# Patient Record
Sex: FEMALE | Race: WHITE
[De-identification: names, ages, dates, MRNs, and addresses within clinical notes are randomized per-mention and may not be internally consistent; named-entity substitution may affect disease eponyms.]

---

## 2017-12-23 NOTE — EDM.PDOC
ED HPI GENERAL MEDICAL PROBLEM





- General


Chief Complaint: Laceration


Stated Complaint: laceration


Time Seen by Provider: 12/23/17 16:35


Source of Information: Reports: Patient


History Limitations: Reports: No Limitations





- History of Present Illness


INITIAL COMMENTS - FREE TEXT/NARRATIVE: 





Was cutting up potatoes and the knife slipped and she cut the proximal left 

fifth digit.  It is 1 cm in length and gapping.  minimal bleeding noted.  No 

other complaints or injuries.


Onset: Today


Location: Reports: Other (left fifth digit)





- Related Data


 Allergies











Allergy/AdvReac Type Severity Reaction Status Date / Time


 


ciprofloxacin [From Cipro] AdvReac  Itching Verified 12/23/17 16:30














Social & Family History





- Tobacco Use


Smoking Status *Q: Never Smoker





ED ROS GENERAL





- Review of Systems


Review Of Systems: See Below


Skin: Reports: Wound (left fifth digit.)





ED EXAM, SKIN/RASH


Exam: See Below


Exam Limited By: No Limitations


General Appearance: Alert, WD/WN, No Apparent Distress


Skin: Wound/Incision (1 cm laceration to the proximal left fifth digit.)





ED SKIN PROCEDURES





- Laceration/Wound Repair


  ** Left Proximal Finger


Lac/Wound length In cm: 1


Appearance: Linear, Clean


Distal NVT: Neuro & Vascular Intact


Anesthetic Type: Local


Local Anesthesia - Lidocaine (Xylocaine): 1% Plain


Local Anesthetic Volume: 1cc


Skin Prep: Saline


Suture Size: other (5-0)


# of Sutures: 2


Suture Type: Nylon, Interrupted, Simple


Tetanus Status Addressed: Yes


Complications: No





Course





- Orders/Labs/Meds


Orders: 





 Active Orders 24 hr











 Category Date Time Status


 


 Lidocaine 1% [Xylocaine 1%] Med  12/23/17 16:33 Once





 20 ml INJECT ONETIME ONE   














Departure





- Departure


Time of Disposition: 16:52


Disposition: Home, Self-Care 01


Condition: Good


Clinical Impression: 


 Laceration








- Discharge Information


Additional Instructions: 


keep clean and dry.  May shower and dry but do not soak in water.


sutures out in 10 days


recheck with any signs of infection.





- Problem List & Annotations


(1) Laceration


SNOMED Code(s): 848596653


   Code(s): IVP7144 -    Status: Acute   Priority: High   Current Visit: Yes   





- Problem List Review


Problem List Initiated/Reviewed/Updated: Yes





- My Orders


Last 24 Hours: 





My Active Orders





12/23/17 16:33


Lidocaine 1% [Xylocaine 1%]   20 ml INJECT ONETIME ONE 














- Assessment/Plan


Last 24 Hours: 





My Active Orders





12/23/17 16:33


Lidocaine 1% [Xylocaine 1%]   20 ml INJECT ONETIME ONE 











Plan: 





Will come back to the clinic on Tuesday to determine if she needs tetanus 

updated.

## 2022-03-27 ENCOUNTER — HOSPITAL ENCOUNTER (EMERGENCY)
Dept: HOSPITAL 38 - CC.ED | Age: 47
Discharge: HOME | End: 2022-03-27
Payer: COMMERCIAL

## 2022-03-27 DIAGNOSIS — J18.9: Primary | ICD-10-CM

## 2022-03-27 DIAGNOSIS — Z88.0: ICD-10-CM

## 2022-03-27 DIAGNOSIS — J01.10: ICD-10-CM

## 2022-03-27 DIAGNOSIS — Z79.899: ICD-10-CM

## 2022-03-27 DIAGNOSIS — E78.00: ICD-10-CM

## 2022-03-27 DIAGNOSIS — E66.9: ICD-10-CM

## 2022-11-18 ENCOUNTER — HOSPITAL ENCOUNTER (EMERGENCY)
Dept: HOSPITAL 38 - CC.ED | Age: 47
Discharge: HOME | End: 2022-11-18
Payer: COMMERCIAL

## 2022-11-18 DIAGNOSIS — Z79.899: ICD-10-CM

## 2022-11-18 DIAGNOSIS — R07.2: Primary | ICD-10-CM

## 2022-11-18 DIAGNOSIS — Z79.82: ICD-10-CM

## 2022-11-18 DIAGNOSIS — E78.00: ICD-10-CM

## 2022-11-18 DIAGNOSIS — E66.9: ICD-10-CM

## 2022-11-18 DIAGNOSIS — Z20.822: ICD-10-CM

## 2022-11-18 DIAGNOSIS — Z88.1: ICD-10-CM

## 2022-11-18 LAB
APTT PPP: 27.6 SEC (ref 23.2–32.3)
CHLORIDE SERPL-SCNC: 103 MEQ/L (ref 98–106)
EGFRCR SERPLBLD CKD-EPI 2021: 91 ML/MIN (ref 60–?)
RSV RNA UPPER RESP QL NAA+PROBE: NEGATIVE
SARS-COV-2 RNA RESP QL NAA+PROBE: NEGATIVE
SODIUM SERPL-SCNC: 141 MEQ/L (ref 136–145)